# Patient Record
Sex: MALE | Race: WHITE | HISPANIC OR LATINO | Employment: UNEMPLOYED | URBAN - METROPOLITAN AREA
[De-identification: names, ages, dates, MRNs, and addresses within clinical notes are randomized per-mention and may not be internally consistent; named-entity substitution may affect disease eponyms.]

---

## 2018-08-14 ENCOUNTER — HOSPITAL ENCOUNTER (EMERGENCY)
Facility: HOSPITAL | Age: 63
Discharge: HOME | End: 2018-08-14
Attending: EMERGENCY MEDICINE

## 2018-08-14 ENCOUNTER — APPOINTMENT (EMERGENCY)
Dept: CT IMAGING | Facility: HOSPITAL | Age: 63
End: 2018-08-14

## 2018-08-14 VITALS
DIASTOLIC BLOOD PRESSURE: 93 MMHG | HEART RATE: 100 BPM | SYSTOLIC BLOOD PRESSURE: 167 MMHG | TEMPERATURE: 98.3 F | OXYGEN SATURATION: 98 % | RESPIRATION RATE: 16 BRPM | WEIGHT: 257.94 LBS

## 2018-08-14 DIAGNOSIS — S16.1XXA STRAIN OF NECK MUSCLE, INITIAL ENCOUNTER: Primary | ICD-10-CM

## 2018-08-14 DIAGNOSIS — S46.219A TEAR OF BICEPS MUSCLE, UNSPECIFIED LATERALITY, INITIAL ENCOUNTER: ICD-10-CM
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
## 2018-08-14 PROCEDURE — 96372 THER/PROPH/DIAG INJ SC/IM: CPT

## 2018-08-14 PROCEDURE — 72125 CT NECK SPINE W/O DYE: CPT

## 2018-08-14 PROCEDURE — 99284 EMERGENCY DEPT VISIT MOD MDM: CPT

## 2018-08-14 RX ORDER — CYCLOBENZAPRINE HCL 5 MG
TABLET ORAL
Qty: 12 TABLET | Refills: 0 | Status: SHIPPED | OUTPATIENT
Start: 2018-08-14

## 2018-08-14 RX ORDER — KETOROLAC TROMETHAMINE 30 MG/ML
30 INJECTION, SOLUTION INTRAMUSCULAR; INTRAVENOUS ONCE
Status: COMPLETED | OUTPATIENT
Start: 2018-08-14 | End: 2018-08-14

## 2018-08-14 RX ORDER — IBUPROFEN 800 MG/1
800 TABLET ORAL EVERY 6 HOURS PRN
Qty: 30 TABLET | Refills: 0 | Status: SHIPPED | OUTPATIENT
Start: 2018-08-14 | End: 2019-09-25

## 2018-08-14 RX ORDER — CYCLOBENZAPRINE HCL 10 MG
10 TABLET ORAL ONCE
Status: COMPLETED | OUTPATIENT
Start: 2018-08-14 | End: 2018-08-14

## 2018-08-14 RX ADMIN — CYCLOBENZAPRINE HYDROCHLORIDE 10 MG: 10 TABLET, FILM COATED ORAL at 19:24

## 2018-08-14 RX ADMIN — KETOROLAC TROMETHAMINE 30 MG: 30 INJECTION, SOLUTION INTRAMUSCULAR at 19:22
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
## 2018-08-14 NOTE — ED NOTES
Patient transported to [facility] [address] [facility], [facility]  08/14/18 6349

## 2018-08-15 NOTE — DISCHARGE INSTRUCTIONS
Cervical Strain   WHAT YOU NEED TO KNOW:   A cervical strain is a stretched or torn muscle or tendon in your neck  Tendons are strong tissues that connect muscles to bones  Common causes of cervical strains include a car accident, a fall, or a sports injury  DISCHARGE INSTRUCTIONS:   Return to the emergency department if:   · You have pain or numbness from your shoulder down to your hand  · You have problems with your vision, hearing, or balance  · You feel confused or cannot concentrate  · You have problems with movement and strength  Contact your healthcare provider if:   · You have increased swelling or pain in your neck  · You have questions or concerns about your condition or care  Medicines: You may need any of the following:  · Acetaminophen  decreases pain and fever  It is available without a doctor's order  Ask how much to take and how often to take it  Follow directions  Read the labels of all other medicines you are using to see if they also contain acetaminophen, or ask your doctor or pharmacist  Acetaminophen can cause liver damage if not taken correctly  Do not use more than 4 grams (4,000 milligrams) total of acetaminophen in one day  · NSAIDs , such as ibuprofen, help decrease swelling, pain, and fever  This medicine is available with or without a doctor's order  NSAIDs can cause stomach bleeding or kidney problems in certain people  If you take blood thinner medicine, always ask your healthcare provider if NSAIDs are safe for you  Always read the medicine label and follow directions  · Muscle relaxers  help decrease pain and muscle spasms  · Prescription pain medicine  may be given  Ask your healthcare provider how to take this medicine safely  Some prescription pain medicines contain acetaminophen  Do not take other medicines that contain acetaminophen without talking to your healthcare provider  Too much acetaminophen may cause liver damage   Prescription pain medicine may cause constipation  Ask your healthcare provider how to prevent or treat constipation  · Take your medicine as directed  Contact your healthcare provider if you think your medicine is not helping or if you have side effects  Tell him or her if you are allergic to any medicine  Keep a list of the medicines, vitamins, and herbs you take  Include the amounts, and when and why you take them  Bring the list or the pill bottles to follow-up visits  Carry your medicine list with you in case of an emergency  Manage your symptoms:   · Apply heat  on your neck for 15 to 20 minutes, 4 to 6 times a day or as directed  Heat helps decrease pain, stiffness, and muscle spasms  · Begin gentle neck exercises  as soon as you can move your neck without pain  Exercises will help decrease stiffness and improve the strength and movement of your neck  Ask your healthcare provider what kind of exercises you should do  · Gradually return to your usual activities as directed  Stop if you have pain  Avoid activities that can cause more damage to your neck, such as heavy lifting or strenuous exercise  · Sleep without a pillow  to help decrease pain  Instead, roll a small towel tightly and place it under your neck  · Go to physical therapy as directed  A physical therapist teaches you exercises to help improve movement and strength, and to decrease pain  Prevent neck injury:   · Drive safely  Make sure everyone in your car wears a seatbelt  A seatbelt can save your life if you are in an accident  Do not use your cell phone when you are driving  This could distract you and cause an accident  Pull over if you need to make a call or send a text message  · Wear helmets, lifejackets, and protective gear  Always wear a helmet when you ride a bike or motorcycle, go skiing, or play sports that could cause a head injury  Wear protective equipment when you play sports   Wear a lifejacket when you are on a boat or doing water sports  Follow up with your healthcare provider as directed: You may be referred to an orthopedist or physical therapies  Write down your questions so you remember to ask them during your visits  © 2017 [employer]  Information is for End User's use only and may not be sold, redistributed or otherwise used for commercial purposes  All illustrations and images included in CareNotes® are the copyrighted property of A [employer] M [employer]  or [employer]  The above information is an  only  It is not intended as medical advice for individual conditions or treatments  Talk to your doctor, nurse or pharmacist before following any medical regimen to see if it is safe and effective for you  Motor Vehicle Accident   WHAT YOU NEED TO KNOW:   A motor vehicle accident (MVA) can cause injury from the impact or from being thrown around inside the car  You may have a bruise on your abdomen, chest, or neck from the seatbelt  You may also have pain in your face, neck, or back  You may have pain in your knee, hip, or thigh if your body hits the dash or the steering wheel  Muscle pain is commonly worse 1 to 2 days after an MVA  DISCHARGE INSTRUCTIONS:   Call 911 if:   · You have new or worsening chest pain or shortness of breath  Return to the emergency department if:   · You have new or worsening pain in your abdomen  · You have nausea and vomiting that does not get better  · You have a severe headache  · You have weakness, tingling, or numbness in your arms or legs  · You have new or worsening pain that makes it hard for you to move  Contact your healthcare provider if:   · You have pain that develops 2 to 3 days after the MVA  · You have questions or concerns about your condition or care  Medicines:   · Pain medicine: You may be given medicine to take away or decrease pain  Do not wait until the pain is severe before you take your medicine      · NSAIDs , such as ibuprofen, help decrease swelling, pain, and fever  This medicine is available with or without a doctor's order  NSAIDs can cause stomach bleeding or kidney problems in certain people  If you take blood thinner medicine, always ask if NSAIDs are safe for you  Always read the medicine label and follow directions  Do not give these medicines to children under 10months of age without direction from your child's healthcare provider  · Take your medicine as directed  Contact your healthcare provider if you think your medicine is not helping or if you have side effects  Tell him of her if you are allergic to any medicine  Keep a list of the medicines, vitamins, and herbs you take  Include the amounts, and when and why you take them  Bring the list or the pill bottles to follow-up visits  Carry your medicine list with you in case of an emergency  Follow up with your healthcare provider as directed:  Write down your questions so you remember to ask them during your visits  Safety tips:   · Always wear your seatbelt  This will help reduce serious injury from an MVA  · Use child safety seats  Your child needs to ride in a child safety seat made for his age, height, and weight  Ask your healthcare provider for more information about child safety seats  · Decrease speed  Drive the speed limit to reduce your risk for an MVA  · Do not drive if you are tired  You will react more slowly when you are tired  The slowed reaction time will increase your risk for an MVA  · Do not talk or text on your cell phone while you drive  You cannot respond fast enough in an emergency if you are distracted by texts or conversations  · Do not drink and drive  Use a designated   Call a taxi or get a ride home with someone if you have been drinking  Do not let your friends drive if they have been drinking alcohol  · Do not use illegal drugs and drive    You may be more tired or take risks that you normally would not take  Do not drive after you take prescription medicines that make you sleepy  Self-care:   · Use ice and heat  Ice helps decrease swelling and pain  Ice may also help prevent tissue damage  Use an ice pack, or put crushed ice in a plastic bag  Cover it with a towel and apply to your injured area for 15 to 20 minutes every hour, or as directed  After 2 days, use a heating pad on your injured area  Use heat as directed  · Gently stretch  Use gentle exercises to stretch your muscles after an MVA  Ask your healthcare provider for exercises you can do  © 2017 [address] Information is for End User's use only and may not be sold, redistributed or otherwise used for commercial purposes  All illustrations and images included in CareNotes® are the copyrighted property of A [employer] M [employer] Inc  or [employer]  The above information is an  only  It is not intended as medical advice for individual conditions or treatments  Talk to your doctor, nurse or pharmacist before following any medical regimen to see if it is safe and effective for you  Muscle Strain   WHAT YOU NEED TO KNOW:   A muscle strain is a twist, pull, or tear of a muscle or tendon  A tendon is a strong elastic tissue that connects a muscle to a bone  Signs of a strained muscle include bruising and swelling over the area, pain with movement, and loss of strength  DISCHARGE INSTRUCTIONS:   Return to the emergency department if:   · You suddenly cannot feel or move your injured muscle  Contact your healthcare provider if:   · Your pain and swelling worsen or do not go away  · You have questions or concerns about your condition or care  Medicines:   · NSAIDs  help decrease swelling and pain or fever  This medicine is available with or without a doctor's order  NSAIDs can cause stomach bleeding or kidney problems in certain people   If you take blood thinner medicine, always ask your healthcare provider if NSAIDs are safe for you  Always read the medicine label and follow directions  · Muscle relaxers  help decrease pain and muscle spasms  · Take your medicine as directed  Contact your healthcare provider if you think your medicine is not helping or if you have side effects  Tell him of her if you are allergic to any medicine  Keep a list of the medicines, vitamins, and herbs you take  Include the amounts, and when and why you take them  Bring the list or the pill bottles to follow-up visits  Carry your medicine list with you in case of an emergency  Follow up with your healthcare provider as directed: Your healthcare provider may suggest that you have a follow-up visit before you go back to your usual activity  Write down your questions so you remember to ask them during your visits  Self-care:   · 3 to 7 days after the injury:  Use Rest, Ice, Compression, and Elevation (RICE) to help stop bruising and decrease pain and swelling  ¨ Rest:  Rest your muscle to allow your injury to heal  When the pain decreases, begin normal, slow movements  For mild and moderate muscle strains, you should rest your muscles for about 2 days  However, if you have a severe muscle strain, you should rest for 10 to 14 days  You may need to use crutches to walk if your muscle strain is in your legs or lower body  ¨ Ice:  Put an ice pack on the injured area  Put a towel between the ice pack and your skin  Do not put the ice pack directly on your skin  You can use a package of frozen peas instead of an ice pack  ¨ Compression:  You may need to wrap an elastic bandage around the area to decrease swelling  It should be tight enough for you to feel support  Do not wrap it too tightly  ¨ Elevation:  Keep the injured muscle raised above your heart if possible  For example if you have a strain of your lower leg muscle, lie down and prop your leg up on pillows  This helps decrease pain and swelling      · 3 to 21 days after the injury:  Start to slowly and regularly exercise your muscle  This will help it heal  If you feel pain, decrease how hard you are exercising  · 1 to 6 weeks after the injury:  Stretch the injured muscle  Hold the stretch for about 30 seconds  Do this 4 times a day  You may stretch the muscle until you feel a slight pull  Stop stretching if you feel pain  · 2 weeks to 6 months after the injury:  The goal of this phase is to return to the activity you were doing before the injury happened, without hurting the muscle again  · 3 weeks to 6 months after the injury:  Keep stretching and strengthening your muscles to avoid injury  Slowly increase the time and distance that you exercise  You may have signs and symptoms of muscle strain 6 months after the injury, even if you do things to help it heal  In this case, you may need surgery on the muscle  © 2017 [employer] Information is for End User's use only and may not be sold, redistributed or otherwise used for commercial purposes  All illustrations and images included in CareNotes® are the copyrighted property of [employer]  or [employer]  The above information is an  only  It is not intended as medical advice for individual conditions or treatments  Talk to your doctor, nurse or pharmacist before following any medical regimen to see if it is safe and effective for you

## 2018-08-15 NOTE — ED NOTES
Patient awake and alert  No distress noted  No further questions upon discharge       [place] LETITIA Grimm  08/14/18 2025

## 2019-09-25 ENCOUNTER — HOSPITAL ENCOUNTER (EMERGENCY)
Facility: HOSPITAL | Age: 64
Discharge: HOME | End: 2019-09-25
Attending: EMERGENCY MEDICINE | Admitting: EMERGENCY MEDICINE
Payer: COMMERCIAL

## 2019-09-25 ENCOUNTER — APPOINTMENT (EMERGENCY)
Dept: RADIOLOGY | Facility: HOSPITAL | Age: 64
End: 2019-09-25
Payer: COMMERCIAL

## 2019-09-25 VITALS
RESPIRATION RATE: 16 BRPM | DIASTOLIC BLOOD PRESSURE: 75 MMHG | TEMPERATURE: 98.5 F | HEIGHT: 65 IN | SYSTOLIC BLOOD PRESSURE: 139 MMHG | OXYGEN SATURATION: 97 % | WEIGHT: 255 LBS | BODY MASS INDEX: 42.49 KG/M2 | HEART RATE: 76 BPM

## 2019-09-25 DIAGNOSIS — R10.84 GENERALIZED ABDOMINAL PAIN: Primary | ICD-10-CM

## 2019-09-25 LAB
ALBUMIN SERPL BCP-MCNC: 3.5 G/DL (ref 3.5–5)
ALP SERPL-CCNC: 158 U/L (ref 46–116)
ALT SERPL W P-5'-P-CCNC: 37 U/L (ref 12–78)
ANION GAP SERPL CALCULATED.3IONS-SCNC: 8 MMOL/L (ref 4–13)
AST SERPL W P-5'-P-CCNC: 16 U/L (ref 5–45)
ATRIAL RATE: 74 BPM
BASOPHILS # BLD AUTO: 0.04 THOUSANDS/ΜL (ref 0–0.1)
BASOPHILS NFR BLD AUTO: 1 % (ref 0–1)
BILIRUB SERPL-MCNC: 0.33 MG/DL (ref 0.2–1)
BUN SERPL-MCNC: 15 MG/DL (ref 5–25)
CALCIUM SERPL-MCNC: 9.3 MG/DL (ref 8.3–10.1)
CHLORIDE SERPL-SCNC: 107 MMOL/L (ref 100–108)
CO2 SERPL-SCNC: 25 MMOL/L (ref 21–32)
CREAT SERPL-MCNC: 0.91 MG/DL (ref 0.6–1.3)
EOSINOPHIL # BLD AUTO: 0.2 THOUSAND/ΜL (ref 0–0.61)
EOSINOPHIL NFR BLD AUTO: 2 % (ref 0–6)
ERYTHROCYTE [DISTWIDTH] IN BLOOD BY AUTOMATED COUNT: 13.5 % (ref 11.6–15.1)
GFR SERPL CREATININE-BSD FRML MDRD: 91 ML/MIN/1.73SQ M
GLUCOSE SERPL-MCNC: 108 MG/DL (ref 65–140)
HCT VFR BLD AUTO: 42.3 % (ref 36.5–49.3)
HGB BLD-MCNC: 13.5 G/DL (ref 12–17)
IMM GRANULOCYTES # BLD AUTO: 0.03 THOUSAND/UL (ref 0–0.2)
IMM GRANULOCYTES NFR BLD AUTO: 0 % (ref 0–2)
LIPASE SERPL-CCNC: 92 U/L (ref 73–393)
LYMPHOCYTES # BLD AUTO: 3.18 THOUSANDS/ΜL (ref 0.6–4.47)
LYMPHOCYTES NFR BLD AUTO: 36 % (ref 14–44)
MCH RBC QN AUTO: 27.5 PG (ref 26.8–34.3)
MCHC RBC AUTO-ENTMCNC: 31.9 G/DL (ref 31.4–37.4)
MCV RBC AUTO: 86 FL (ref 82–98)
MONOCYTES # BLD AUTO: 0.71 THOUSAND/ΜL (ref 0.17–1.22)
MONOCYTES NFR BLD AUTO: 8 % (ref 4–12)
NEUTROPHILS # BLD AUTO: 4.69 THOUSANDS/ΜL (ref 1.85–7.62)
NEUTS SEG NFR BLD AUTO: 53 % (ref 43–75)
NRBC BLD AUTO-RTO: 0 /100 WBCS
P AXIS: 68 DEGREES
PLATELET # BLD AUTO: 252 THOUSANDS/UL (ref 149–390)
PMV BLD AUTO: 11.2 FL (ref 8.9–12.7)
POTASSIUM SERPL-SCNC: 4.1 MMOL/L (ref 3.5–5.3)
PR INTERVAL: 166 MS
PROT SERPL-MCNC: 7 G/DL (ref 6.4–8.2)
QRS AXIS: -24 DEGREES
QRSD INTERVAL: 100 MS
QT INTERVAL: 374 MS
QTC INTERVAL: 415 MS
RBC # BLD AUTO: 4.91 MILLION/UL (ref 3.88–5.62)
SODIUM SERPL-SCNC: 140 MMOL/L (ref 136–145)
T WAVE AXIS: 16 DEGREES
TROPONIN I SERPL-MCNC: <0.02 NG/ML
VENTRICULAR RATE: 74 BPM
WBC # BLD AUTO: 8.85 THOUSAND/UL (ref 4.31–10.16)

## 2019-09-25 PROCEDURE — 36415 COLL VENOUS BLD VENIPUNCTURE: CPT | Performed by: EMERGENCY MEDICINE

## 2019-09-25 PROCEDURE — 96374 THER/PROPH/DIAG INJ IV PUSH: CPT

## 2019-09-25 PROCEDURE — 99284 EMERGENCY DEPT VISIT MOD MDM: CPT | Performed by: EMERGENCY MEDICINE

## 2019-09-25 PROCEDURE — 74177 CT ABD & PELVIS W/CONTRAST: CPT

## 2019-09-25 PROCEDURE — 93010 ELECTROCARDIOGRAM REPORT: CPT | Performed by: INTERNAL MEDICINE

## 2019-09-25 PROCEDURE — 96361 HYDRATE IV INFUSION ADD-ON: CPT

## 2019-09-25 PROCEDURE — 85025 COMPLETE CBC W/AUTO DIFF WBC: CPT | Performed by: EMERGENCY MEDICINE

## 2019-09-25 PROCEDURE — 83690 ASSAY OF LIPASE: CPT | Performed by: EMERGENCY MEDICINE

## 2019-09-25 PROCEDURE — 93005 ELECTROCARDIOGRAM TRACING: CPT

## 2019-09-25 PROCEDURE — 84484 ASSAY OF TROPONIN QUANT: CPT | Performed by: EMERGENCY MEDICINE

## 2019-09-25 PROCEDURE — 80053 COMPREHEN METABOLIC PANEL: CPT | Performed by: EMERGENCY MEDICINE

## 2019-09-25 PROCEDURE — 99285 EMERGENCY DEPT VISIT HI MDM: CPT

## 2019-09-25 RX ORDER — ONDANSETRON 2 MG/ML
4 INJECTION INTRAMUSCULAR; INTRAVENOUS ONCE
Status: COMPLETED | OUTPATIENT
Start: 2019-09-25 | End: 2019-09-25

## 2019-09-25 RX ORDER — DICYCLOMINE HCL 20 MG
20 TABLET ORAL 2 TIMES DAILY
Qty: 20 TABLET | Refills: 0 | Status: SHIPPED | OUTPATIENT
Start: 2019-09-25 | End: 2019-10-05

## 2019-09-25 RX ADMIN — ONDANSETRON 4 MG: 2 INJECTION INTRAMUSCULAR; INTRAVENOUS at 01:21

## 2019-09-25 RX ADMIN — SODIUM CHLORIDE 500 ML: 0.9 INJECTION, SOLUTION INTRAVENOUS at 01:22

## 2019-09-25 RX ADMIN — IOHEXOL 100 ML: 350 INJECTION, SOLUTION INTRAVENOUS at 02:32
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
## 2021-04-06 ENCOUNTER — TRANSITIONAL CARE MANAGEMENT (OUTPATIENT)
Dept: FAMILY MEDICINE CLINIC | Facility: CLINIC | Age: 66
End: 2021-04-06

## 2021-05-20 ENCOUNTER — HOSPITAL ENCOUNTER (EMERGENCY)
Facility: HOSPITAL | Age: 66
Discharge: HOME | End: 2021-05-20
Attending: EMERGENCY MEDICINE
Payer: COMMERCIAL

## 2021-05-20 VITALS
HEART RATE: 73 BPM | OXYGEN SATURATION: 96 % | TEMPERATURE: 98.2 F | DIASTOLIC BLOOD PRESSURE: 91 MMHG | RESPIRATION RATE: 18 BRPM | SYSTOLIC BLOOD PRESSURE: 133 MMHG

## 2021-05-20 DIAGNOSIS — K06.8 GINGIVAL BLEEDING: Primary | ICD-10-CM

## 2021-05-20 LAB
ANION GAP SERPL CALCULATED.3IONS-SCNC: 8 MMOL/L (ref 4–13)
APTT PPP: 34 SECONDS (ref 23–37)
BASOPHILS # BLD AUTO: 0.02 THOUSANDS/ΜL (ref 0–0.1)
BASOPHILS NFR BLD AUTO: 0 % (ref 0–1)
BUN SERPL-MCNC: 25 MG/DL (ref 5–25)
CALCIUM SERPL-MCNC: 8.9 MG/DL (ref 8.3–10.1)
CHLORIDE SERPL-SCNC: 106 MMOL/L (ref 100–108)
CO2 SERPL-SCNC: 30 MMOL/L (ref 21–32)
CREAT SERPL-MCNC: 1.15 MG/DL (ref 0.6–1.3)
EOSINOPHIL # BLD AUTO: 0.25 THOUSAND/ΜL (ref 0–0.61)
EOSINOPHIL NFR BLD AUTO: 3 % (ref 0–6)
ERYTHROCYTE [DISTWIDTH] IN BLOOD BY AUTOMATED COUNT: 13.2 % (ref 11.6–15.1)
GFR SERPL CREATININE-BSD FRML MDRD: 67 ML/MIN/1.73SQ M
GLUCOSE SERPL-MCNC: 104 MG/DL (ref 65–140)
HCT VFR BLD AUTO: 45 % (ref 36.5–49.3)
HGB BLD-MCNC: 14.6 G/DL (ref 12–17)
IMM GRANULOCYTES # BLD AUTO: 0.03 THOUSAND/UL (ref 0–0.2)
IMM GRANULOCYTES NFR BLD AUTO: 0 % (ref 0–2)
INR PPP: 1.67 (ref 0.84–1.19)
LYMPHOCYTES # BLD AUTO: 3.21 THOUSANDS/ΜL (ref 0.6–4.47)
LYMPHOCYTES NFR BLD AUTO: 41 % (ref 14–44)
MCH RBC QN AUTO: 28.3 PG (ref 26.8–34.3)
MCHC RBC AUTO-ENTMCNC: 32.4 G/DL (ref 31.4–37.4)
MCV RBC AUTO: 87 FL (ref 82–98)
MONOCYTES # BLD AUTO: 0.65 THOUSAND/ΜL (ref 0.17–1.22)
MONOCYTES NFR BLD AUTO: 8 % (ref 4–12)
NEUTROPHILS # BLD AUTO: 3.77 THOUSANDS/ΜL (ref 1.85–7.62)
NEUTS SEG NFR BLD AUTO: 48 % (ref 43–75)
NRBC BLD AUTO-RTO: 0 /100 WBCS
PLATELET # BLD AUTO: 203 THOUSANDS/UL (ref 149–390)
PMV BLD AUTO: 10.9 FL (ref 8.9–12.7)
POTASSIUM SERPL-SCNC: 4.4 MMOL/L (ref 3.5–5.3)
PROTHROMBIN TIME: 19.8 SECONDS (ref 11.6–14.5)
RBC # BLD AUTO: 5.15 MILLION/UL (ref 3.88–5.62)
SODIUM SERPL-SCNC: 144 MMOL/L (ref 136–145)
WBC # BLD AUTO: 7.93 THOUSAND/UL (ref 4.31–10.16)

## 2021-05-20 PROCEDURE — 36415 COLL VENOUS BLD VENIPUNCTURE: CPT | Performed by: PHYSICIAN ASSISTANT

## 2021-05-20 PROCEDURE — 85610 PROTHROMBIN TIME: CPT | Performed by: PHYSICIAN ASSISTANT

## 2021-05-20 PROCEDURE — 99284 EMERGENCY DEPT VISIT MOD MDM: CPT | Performed by: PHYSICIAN ASSISTANT

## 2021-05-20 PROCEDURE — 80048 BASIC METABOLIC PNL TOTAL CA: CPT | Performed by: PHYSICIAN ASSISTANT

## 2021-05-20 PROCEDURE — 85730 THROMBOPLASTIN TIME PARTIAL: CPT | Performed by: PHYSICIAN ASSISTANT

## 2021-05-20 PROCEDURE — 85025 COMPLETE CBC W/AUTO DIFF WBC: CPT | Performed by: PHYSICIAN ASSISTANT

## 2021-05-20 PROCEDURE — 99283 EMERGENCY DEPT VISIT LOW MDM: CPT

## 2021-05-20 RX ORDER — TRANEXAMIC ACID 100 MG/ML
500 INJECTION, SOLUTION INTRAVENOUS ONCE
Status: DISCONTINUED | OUTPATIENT
Start: 2021-05-20 | End: 2021-05-20 | Stop reason: HOSPADM

## 2021-05-20 NOTE — ED PROVIDER NOTES
History  Chief Complaint   Patient presents with    Medical Problem     pt reports trying to get a piece of steak out of his gums around 1700  pt states his gums wont stop bleeding  pt putting pressure on gums per triage  Patient is a 60-year-old male with history atrial fibrillation with recent cardioversion 2021, on Xarelto, diastolic CHF, and appendectomy that presents emergency department with bleeding to upper left side of gums for 8 hours  Patient denies associated symptomatology  Patient states that while he was attempting to pick teeth out of his upper left molar with a toothpick, he noticed that he had bleeding that was pervasive and came to emergency department for further evaluation and it's cessation  Patient denies palliative and provocative factors  Patient affirms not effective treatment of pressure to left side of maxillary bleeding gum  Patient denies fevers, chills, nausea, vomiting, diarrhea, constipation urinary symptoms  Patient denies recent fall or recent trauma  Patient denies sick contacts or recent travel  Patient denies chest pain, shortness of breath, and abdominal pain  History provided by:  Patient   used: No    Medical Problem  Location:  Left maxillary bleeding gingiva  Severity:  Mild  Onset quality:  Unable to specify  Duration:  8 hours  Timing:  Constant  Progression:  Unchanged  Chronicity:  New  Context:  Attempting to pick steak out of his teeth with a toothpick  Relieved by:  Nothing  Worsened by:  Nothing  Ineffective treatments:  None tried  Associated symptoms: no abdominal pain, no chest pain, no congestion, no cough, no diarrhea, no fever, no headaches, no nausea, no rash, no rhinorrhea, no shortness of breath, no sore throat and no vomiting        Prior to Admission Medications   Prescriptions Last Dose Informant Patient Reported? Taking?    cyclobenzaprine (FLEXERIL) 5 mg tablet   No No   Si-2 PO TID PRN   dicyclomine (BENTYL) 20 mg tablet   No No   Sig: Take 1 tablet (20 mg total) by mouth 2 (two) times a day for 10 days      Facility-Administered Medications: None       History reviewed  No pertinent past medical history  Past Surgical History:   Procedure Laterality Date    APPENDECTOMY      BACK SURGERY         History reviewed  No pertinent family history  I have reviewed and agree with the history as documented  E-Cigarette/Vaping     E-Cigarette/Vaping Substances     Social History     Tobacco Use    Smoking status: Never Smoker    Smokeless tobacco: Never Used   Substance Use Topics    Alcohol use: No    Drug use: No       Review of Systems   Constitutional: Negative for activity change, appetite change, chills and fever  HENT: Negative for congestion, postnasal drip, rhinorrhea, sinus pressure, sinus pain, sore throat and tinnitus  Eyes: Negative for photophobia and visual disturbance  Respiratory: Negative for cough, chest tightness and shortness of breath  Cardiovascular: Negative for chest pain and palpitations  Gastrointestinal: Negative for abdominal pain, constipation, diarrhea, nausea and vomiting  Genitourinary: Negative for difficulty urinating, dysuria, flank pain, frequency and urgency  Musculoskeletal: Negative for back pain, gait problem, neck pain and neck stiffness  Skin: Positive for wound  Negative for pallor and rash  Allergic/Immunologic: Negative for environmental allergies and food allergies  Neurological: Negative for dizziness, weakness, numbness and headaches  Psychiatric/Behavioral: Negative for confusion  All other systems reviewed and are negative  Physical Exam  Physical Exam  Vitals signs and nursing note reviewed  Constitutional:       General: He is awake  Appearance: Normal appearance  He is well-developed  He is not ill-appearing, toxic-appearing or diaphoretic        Comments: /91   Pulse 73   Temp 98 2 °F (36 8 °C) (Oral)   Resp 18   SpO2 96%      HENT:      Head: Normocephalic and atraumatic  Right Ear: Hearing and external ear normal  No decreased hearing noted  No drainage, swelling or tenderness  No mastoid tenderness  Left Ear: Hearing and external ear normal  No decreased hearing noted  No drainage, swelling or tenderness  No mastoid tenderness  Nose: Nose normal       Mouth/Throat:      Lips: Pink  Mouth: Mucous membranes are moist       Pharynx: Oropharynx is clear  Uvula midline  Eyes:      General: Lids are normal  Vision grossly intact  Right eye: No discharge  Left eye: No discharge  Extraocular Movements: Extraocular movements intact  Conjunctiva/sclera: Conjunctivae normal       Pupils: Pupils are equal, round, and reactive to light  Neck:      Musculoskeletal: Full passive range of motion without pain, normal range of motion and neck supple  Normal range of motion  No neck rigidity, spinous process tenderness or muscular tenderness  Vascular: No JVD  Trachea: Trachea and phonation normal  No tracheal tenderness or tracheal deviation  Cardiovascular:      Rate and Rhythm: Normal rate  Pulses: Normal pulses  Radial pulses are 2+ on the right side and 2+ on the left side  Pulmonary:      Effort: Pulmonary effort is normal       Breath sounds: Normal breath sounds  Abdominal:      General: Abdomen is flat  There is no distension  Palpations: Abdomen is not rigid  Musculoskeletal: Normal range of motion  Skin:     General: Skin is warm  Capillary Refill: Capillary refill takes less than 2 seconds  Neurological:      General: No focal deficit present  Mental Status: He is alert and oriented to person, place, and time  GCS: GCS eye subscore is 4  GCS verbal subscore is 5  GCS motor subscore is 6  Sensory: No sensory deficit  Deep Tendon Reflexes: Reflexes are normal and symmetric        Reflex Scores:       Patellar reflexes are 2+ on the right side and 2+ on the left side  Psychiatric:         Mood and Affect: Mood normal          Speech: Speech normal          Behavior: Behavior normal  Behavior is cooperative           Vital Signs  ED Triage Vitals [05/20/21 0308]   Temperature Pulse Respirations Blood Pressure SpO2   98 2 °F (36 8 °C) 73 18 133/91 96 %      Temp Source Heart Rate Source Patient Position - Orthostatic VS BP Location FiO2 (%)   Oral Monitor -- -- --      Pain Score       --           Vitals:    05/20/21 0308   BP: 133/91   Pulse: 73         Visual Acuity      ED Medications  Medications   tranexamic acid 100mg/mL (for epistaxis) 500 mg (has no administration in time range)       Diagnostic Studies  Results Reviewed     Procedure Component Value Units Date/Time    APTT [[id number]]  (Normal) Collected: 05/20/21 0343    Lab Status: Final result Specimen: Blood from Arm, Left Updated: 05/20/21 0407     PTT 34 seconds     Protime-INR [[id number]]  (Abnormal) Collected: 05/20/21 0343    Lab Status: Final result Specimen: Blood from Arm, Left Updated: 05/20/21 0407     Protime 19 8 seconds      INR 7 42    Basic metabolic panel [[id number]] Collected: 05/20/21 0343    Lab Status: Final result Specimen: Blood from Arm, Left Updated: 05/20/21 0406     Sodium 144 mmol/L      Potassium 4 4 mmol/L      Chloride 106 mmol/L      CO2 30 mmol/L      ANION GAP 8 mmol/L      BUN 25 mg/dL      Creatinine 1 15 mg/dL      Glucose 104 mg/dL      Calcium 8 9 mg/dL      eGFR 67 ml/min/1 73sq m     Narrative:      [employer] guidelines for Chronic Kidney Disease (CKD):     Stage 1 with normal or high GFR (GFR > 90 mL/min/1 73 square meters)    Stage 2 Mild CKD (GFR = 60-89 mL/min/1 73 square meters)    Stage 3A Moderate CKD (GFR = 45-59 mL/min/1 73 square meters)    Stage 3B Moderate CKD (GFR = 30-44 mL/min/1 73 square meters)    Stage 4 Severe CKD (GFR = 15-29 mL/min/1 73 square meters)    Stage 5 End Stage CKD (GFR <15 mL/min/1 73 square meters)  Note: GFR calculation is accurate only with a steady state creatinine    CBC and differential [[id number]] Collected: 05/20/21 0343    Lab Status: Final result Specimen: Blood from Arm, Left Updated: 05/20/21 0351     WBC 7 93 Thousand/uL      RBC 5 15 Million/uL      Hemoglobin 14 6 g/dL      Hematocrit 45 0 %      MCV 87 fL      MCH 28 3 pg      MCHC 32 4 g/dL      RDW 13 2 %      MPV 10 9 fL      Platelets 265 Thousands/uL      nRBC 0 /100 WBCs      Neutrophils Relative 48 %      Immat GRANS % 0 %      Lymphocytes Relative 41 %      Monocytes Relative 8 %      Eosinophils Relative 3 %      Basophils Relative 0 %      Neutrophils Absolute 3 77 Thousands/µL      Immature Grans Absolute 0 03 Thousand/uL      Lymphocytes Absolute 3 21 Thousands/µL      Monocytes Absolute 0 65 Thousand/µL      Eosinophils Absolute 0 25 Thousand/µL      Basophils Absolute 0 02 Thousands/µL                  No orders to display              Procedures  Procedures         ED Course  ED Course as of May 20 0504   Thu May 20, 2021   0326 Tdap 11/16/2020      4823 Txa to left maxillary first molar; gingival bleeding       0409 Patient verbalizes that he takes Xarelto        0432 Complete hemostatic control of left maxillary gingival bleeding; proximal to 1st molar, left                                              MDM  Number of Diagnoses or Management Options  Gingival bleeding: new and does not require workup     Amount and/or Complexity of Data Reviewed  Clinical lab tests: ordered and reviewed  Review and summarize past medical records: yes    Risk of Complications, Morbidity, and/or Mortality  Presenting problems: low  Diagnostic procedures: low  Management options: low    Patient Progress  Patient progress: stable    Patient is a 62-year-old male with history atrial fibrillation with recent cardioversion April 2021, on Xarelto, diastolic CHF, and appendectomy that presents emergency department with bleeding to upper left side of gums for 8 hours  Patient hemodynamically stable and afebrile  Patient states that he is currently on Xarelto for atrial fibrillation-PT INR 19 8/1 67  APTT normal  Normal electrolytes, normal H&H, normal platelets  Applied TXA to maxillary left 1st molar proximal to gingival bleeding; abrasion-no fluctuance, induration, no laceration; no tenderness  Complete hemostatic control attained of aforementioned area utilizing TXA topically  Follow-up with PCP  Follow-up with emergency department symptoms persist or exacerbate  Patient demonstrates verbal understanding of all clinical laboratory findings, discharge instructions, follow-up verbalized agreement with current treatment plan  Disposition  Final diagnoses:   Gingival bleeding - Left maxillary     Time reflects when diagnosis was documented in both MDM as applicable and the Disposition within this note     Time User Action Codes Description Comment    5/20/2021  4:36 AM Lorena Amaya Add [K06 8] Gingival bleeding     5/20/2021  4:36 AM Lorena Amaya Modify [K06 8] Gingival bleeding Left maxillary      ED Disposition     ED Disposition Condition Date/Time Comment    Discharge Stable Thu May 20, 2021  4:36 AM Royer Marte discharge to home/self care              Follow-up Information     Follow up With Specialties Details Why Contact Info Additional 340 Kayden Lee MD Internal Medicine   [address] [facility]  [facility] [address]       [address] Emergency Department Emergency Medicine   [address] Emergency Department,  [address], [place], [address]          Discharge Medication List as of 5/20/2021  4:39 AM      CONTINUE these medications which have NOT CHANGED    Details   cyclobenzaprine (FLEXERIL) 5 mg tablet 1-2 PO TID PRN, Normal dicyclomine (BENTYL) 20 mg tablet Take 1 tablet (20 mg total) by mouth 2 (two) times a day for 10 days, Starting Wed 9/25/2019, Until Sat 10/5/2019, Print           No discharge procedures on file      PDMP Review       Value Time User    PDMP Reviewed  Yes 5/20/2021  3:19 AM Tom Ruby MD          ED Provider  Electronically Signed by           Earlis Sandhoff, PA-C  05/20/21 5731

## 2024-11-21 ENCOUNTER — APPOINTMENT (EMERGENCY)
Dept: RADIOLOGY | Facility: HOSPITAL | Age: 69
End: 2024-11-21
Payer: COMMERCIAL

## 2024-11-21 ENCOUNTER — HOSPITAL ENCOUNTER (EMERGENCY)
Facility: HOSPITAL | Age: 69
Discharge: HOME | End: 2024-11-21
Payer: COMMERCIAL

## 2024-11-21 VITALS
HEART RATE: 70 BPM | OXYGEN SATURATION: 96 % | TEMPERATURE: 98.4 F | SYSTOLIC BLOOD PRESSURE: 143 MMHG | DIASTOLIC BLOOD PRESSURE: 78 MMHG | RESPIRATION RATE: 18 BRPM

## 2024-11-21 DIAGNOSIS — M54.32 LEFT SIDED SCIATICA: ICD-10-CM

## 2024-11-21 DIAGNOSIS — R07.9 CHEST PAIN: Primary | ICD-10-CM

## 2024-11-21 DIAGNOSIS — M25.519 SHOULDER PAIN: ICD-10-CM

## 2024-11-21 DIAGNOSIS — R20.2 PARESTHESIA OF BOTH HANDS: ICD-10-CM

## 2024-11-21 LAB
2HR DELTA HS TROPONIN: -1 NG/L
ALBUMIN SERPL BCG-MCNC: 4.2 G/DL (ref 3.5–5)
ALP SERPL-CCNC: 166 U/L (ref 34–104)
ALT SERPL W P-5'-P-CCNC: 23 U/L (ref 7–52)
ANION GAP SERPL CALCULATED.3IONS-SCNC: 6 MMOL/L (ref 4–13)
AST SERPL W P-5'-P-CCNC: 17 U/L (ref 13–39)
BASOPHILS # BLD AUTO: 0.02 THOUSANDS/ÂΜL (ref 0–0.1)
BASOPHILS NFR BLD AUTO: 0 % (ref 0–1)
BILIRUB SERPL-MCNC: 0.52 MG/DL (ref 0.2–1)
BUN SERPL-MCNC: 22 MG/DL (ref 5–25)
CALCIUM SERPL-MCNC: 9.2 MG/DL (ref 8.4–10.2)
CARDIAC TROPONIN I PNL SERPL HS: 7 NG/L (ref ?–50)
CARDIAC TROPONIN I PNL SERPL HS: 8 NG/L (ref ?–50)
CHLORIDE SERPL-SCNC: 105 MMOL/L (ref 96–108)
CO2 SERPL-SCNC: 28 MMOL/L (ref 21–32)
CREAT SERPL-MCNC: 0.88 MG/DL (ref 0.6–1.3)
EOSINOPHIL # BLD AUTO: 0.13 THOUSAND/ÂΜL (ref 0–0.61)
EOSINOPHIL NFR BLD AUTO: 2 % (ref 0–6)
ERYTHROCYTE [DISTWIDTH] IN BLOOD BY AUTOMATED COUNT: 13.6 % (ref 11.6–15.1)
GFR SERPL CREATININE-BSD FRML MDRD: 88 ML/MIN/1.73SQ M
GLUCOSE SERPL-MCNC: 106 MG/DL (ref 65–140)
HCT VFR BLD AUTO: 44.5 % (ref 36.5–49.3)
HGB BLD-MCNC: 14.4 G/DL (ref 12–17)
IMM GRANULOCYTES # BLD AUTO: 0.03 THOUSAND/UL (ref 0–0.2)
IMM GRANULOCYTES NFR BLD AUTO: 0 % (ref 0–2)
LYMPHOCYTES # BLD AUTO: 3.13 THOUSANDS/ÂΜL (ref 0.6–4.47)
LYMPHOCYTES NFR BLD AUTO: 36 % (ref 14–44)
MCH RBC QN AUTO: 27.9 PG (ref 26.8–34.3)
MCHC RBC AUTO-ENTMCNC: 32.4 G/DL (ref 31.4–37.4)
MCV RBC AUTO: 86 FL (ref 82–98)
MONOCYTES # BLD AUTO: 0.57 THOUSAND/ÂΜL (ref 0.17–1.22)
MONOCYTES NFR BLD AUTO: 7 % (ref 4–12)
NEUTROPHILS # BLD AUTO: 4.73 THOUSANDS/ÂΜL (ref 1.85–7.62)
NEUTS SEG NFR BLD AUTO: 55 % (ref 43–75)
NRBC BLD AUTO-RTO: 0 /100 WBCS
PLATELET # BLD AUTO: 244 THOUSANDS/UL (ref 149–390)
PMV BLD AUTO: 10.5 FL (ref 8.9–12.7)
POTASSIUM SERPL-SCNC: 4 MMOL/L (ref 3.5–5.3)
PROT SERPL-MCNC: 6.9 G/DL (ref 6.4–8.4)
RBC # BLD AUTO: 5.17 MILLION/UL (ref 3.88–5.62)
SODIUM SERPL-SCNC: 139 MMOL/L (ref 135–147)
WBC # BLD AUTO: 8.61 THOUSAND/UL (ref 4.31–10.16)

## 2024-11-21 PROCEDURE — 93005 ELECTROCARDIOGRAM TRACING: CPT

## 2024-11-21 PROCEDURE — 84484 ASSAY OF TROPONIN QUANT: CPT

## 2024-11-21 PROCEDURE — 36415 COLL VENOUS BLD VENIPUNCTURE: CPT

## 2024-11-21 PROCEDURE — 85025 COMPLETE CBC W/AUTO DIFF WBC: CPT

## 2024-11-21 PROCEDURE — 80053 COMPREHEN METABOLIC PANEL: CPT

## 2024-11-21 PROCEDURE — 99285 EMERGENCY DEPT VISIT HI MDM: CPT | Performed by: EMERGENCY MEDICINE

## 2024-11-21 PROCEDURE — 71045 X-RAY EXAM CHEST 1 VIEW: CPT

## 2024-11-21 PROCEDURE — 99285 EMERGENCY DEPT VISIT HI MDM: CPT

## 2024-11-21 RX ORDER — ACETAMINOPHEN 325 MG/1
975 TABLET ORAL ONCE
Status: COMPLETED | OUTPATIENT
Start: 2024-11-21 | End: 2024-11-21

## 2024-11-21 RX ADMIN — ACETAMINOPHEN 975 MG: 325 TABLET, FILM COATED ORAL at 15:13

## 2024-11-21 RX ADMIN — DICLOFENAC SODIUM 4 G: 10 GEL TOPICAL at 15:14

## 2024-11-21 NOTE — DISCHARGE INSTRUCTIONS
Please follow up with your primary care provider, cardiology, and orthopedic surgery for further management of your symptoms. Referrals have been placed for you.     You may use voltaren cream as well as Tylenol for your pain. You can take 1000 mg Tylenol every 8 hours. Do not go over 3000 mg in 24 hours.    Please do NOT take ibuprofen, Advil, Aleve, or any other NSAIDs.

## 2024-11-21 NOTE — ED ATTENDING ATTESTATION
11/21/2024  I, Richard Benson MD, saw and evaluated the patient. I have discussed the patient with the resident/non-physician practitioner and agree with the resident's/non-physician practitioner's findings, Plan of Care, and MDM as documented in the resident's/non-physician practitioner's note, except where noted. All available labs and Radiology studies were reviewed.  I was present for key portions of any procedure(s) performed by the resident/non-physician practitioner and I was immediately available to provide assistance.       At this point I agree with the current assessment done in the Emergency Department.  I have conducted an independent evaluation of this patient a history and physical is as follows:  Briefly,  pain69-year-old male presenting with left-sided chest pain. Patient states that he has had chronic right-sided shoulder pain over the past 1.5 months,  over the past few days began having pain in the left upper chest.  This pain is dull, moderate intensity, worse with moving the arm, picking things up, or pressing the area, unchanged with exertion.  He denies shortness of breath, fever, nausea, vomiting, diarrhea or other symptoms.  Patient states his wife is disabled and he needs to lift her to transfer her as she cannot walk, this seems to be exacerbating the pain in his shoulders.  On examination, patient tender to palpation at the superior aspect of the right shoulder, pain reproduced with abduction of that shoulder, on the left side patient is point tender to the left upper chest, no crepitus, no deformity, no pain out of proportion.  Tenderness exactly reproduces his chest pain.  No swelling the legs, heart sounds normal, lungs clear to auscultation.  In no acute distress, no diaphoresis.  Workup in ER overall reassuring, with reassuring EKG unchanged from prior, negative serial troponins, otherwise reassuring blood work and plain films.  Overall, seems consistent with musculoskeletal  pain, likely exacerbated by frequent lifting of his wife, low suspicion for ACS although patient does have multiple cardiac risk factors. Treated symptomatically, discharged with strict return precautions, follow with primary care doctor, cardiology, as well as orthopedics.    ED Course         Critical Care Time  Procedures

## 2024-11-22 LAB
ATRIAL RATE: 72 BPM
ATRIAL RATE: 86 BPM
P AXIS: 14 DEGREES
P AXIS: 36 DEGREES
PR INTERVAL: 156 MS
PR INTERVAL: 162 MS
QRS AXIS: -4 DEGREES
QRS AXIS: 13 DEGREES
QRSD INTERVAL: 104 MS
QRSD INTERVAL: 104 MS
QT INTERVAL: 366 MS
QT INTERVAL: 372 MS
QTC INTERVAL: 400 MS
QTC INTERVAL: 445 MS
T WAVE AXIS: -28 DEGREES
T WAVE AXIS: -5 DEGREES
VENTRICULAR RATE: 72 BPM
VENTRICULAR RATE: 86 BPM

## 2024-11-22 PROCEDURE — 93010 ELECTROCARDIOGRAM REPORT: CPT | Performed by: INTERNAL MEDICINE

## 2024-11-22 NOTE — ED PROVIDER NOTES
Time reflects when diagnosis was documented in both MDM as applicable and the Disposition within this note       Time User Action Codes Description Comment    11/21/2024  3:02 PM Lucy Sanchez Add [R07.9] Chest pain     11/21/2024  3:02 PM Rayne Sancheze Add [M25.519] Shoulder pain     11/21/2024  3:02 PM DanielRayne trimblee Add [R20.2] Paresthesia of both hands     11/21/2024  3:02 PM Lucy Sanchez Add [M54.32] Left sided sciatica           ED Disposition       ED Disposition   Discharge    Condition   Stable    Date/Time   Thu Nov 21, 2024  3:10 PM    Comment   Vic Lara discharge to home/self care.                   Assessment & Plan       Medical Decision Making    See ED course for MDM.    ED Course as of 11/22/24 0006   Thu Nov 21, 2024   1500 DDx includes but not limited to: ACS, strain, sprain, contusion, CHF exacerbation.   1501 Delta 2hr hsTnI: -1  Stable, low suspicion for ACS per algorithm   1516 Doubt ACS. Suspect patient's symptoms are musculoskeletal given reproducible nature of his pain. Will recommend supportive care with Tylenol and Voltaren cream. He will need to follow up with his PCP, cardiology, and orthopedics for further management of his symptoms today, referral placed. Return precautions discussed. Pt voices understanding and agrees with plan. All questions and concerns addressed at this time.   1543 XR chest 1 view portable  Mild pulmonary venous congestion and borderline cardiomegaly.       Medications   acetaminophen (TYLENOL) tablet 975 mg (975 mg Oral Given 11/21/24 1513)       ED Risk Strat Scores   HEART Risk Score      Flowsheet Row Most Recent Value   Heart Score Risk Calculator    History 0 Filed at: 11/21/2024 1457   ECG 0 Filed at: 11/21/2024 1457   Age 2 Filed at: 11/21/2024 1457   Risk Factors 2 Filed at: 11/21/2024 1457   Troponin 0 Filed at: 11/21/2024 1457   HEART Score 4 Filed at: 11/21/2024 1457                                                   History of Present Illness        Chief Complaint   Patient presents with    Chest Pain     Patient reports left sided chest pain that travels down his arm, worse when lying down, also reports right arm pain       History reviewed. No pertinent past medical history.   Past Surgical History:   Procedure Laterality Date    APPENDECTOMY      BACK SURGERY        History reviewed. No pertinent family history.   Social History     Tobacco Use    Smoking status: Never    Smokeless tobacco: Never   Substance Use Topics    Alcohol use: No    Drug use: No      E-Cigarette/Vaping      E-Cigarette/Vaping Substances      I have reviewed and agree with the history as documented.     HPI  Patient is a 69-year-old male with a history of hypertension, hyperlipidemia, diabetes, presenting with left-sided chest pain.  Pain is worse at night, reproducible with palpation and laying on L side.  Denies nausea, vomiting, diaphoresis, worsening of his chronic shortness of breath. 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  Reports tenderness to both shoulders, as well as some chronic bilateral paresthesias to the hands, and some sciatic pain in the left hip. Denies falls or recent trauma. Reports he takes ibuprofen for pain with some relief.  Denies fevers, chills, change in medication, bowel or urinary changes, weakness of extremities, or any other symptoms at this time.    Review of Systems   Constitutional:  Negative for activity change, fever and unexpected weight change.   Respiratory:  Negative for cough, chest tightness and shortness of breath.    Cardiovascular:  Positive for chest pain. Negative for palpitations.   Gastrointestinal:  Negative for abdominal pain, diarrhea, nausea and vomiting.   Genitourinary:  Negative for dysuria and hematuria.   Musculoskeletal:  Positive for myalgias.   Skin:  Negative for wound.   Allergic/Immunologic: Negative for immunocompromised state.   Neurological:  Negative for syncope.    All other systems reviewed and are negative.          Objective       ED Triage Vitals   Temperature Pulse Blood Pressure Respirations SpO2 Patient Position - Orthostatic VS   11/21/24 1145 11/21/24 1144 11/21/24 1146 11/21/24 1144 11/21/24 1146 11/21/24 1359   98.4 °F (36.9 °C) 86 141/88 18 94 % Sitting      Temp Source Heart Rate Source BP Location FiO2 (%) Pain Score    11/21/24 1144 11/21/24 1359 11/21/24 1359 -- --    Oral Monitor Right arm        Vitals      Date and Time Temp Pulse SpO2 Resp BP Pain Score FACES Pain Rating User   11/21/24 1500 -- 70 96 % 18 143/78 -- -- AW   11/21/24 1359 -- 73 97 % 18 141/69 -- -- AW   11/21/24 1146 -- -- -- -- 141/88 -- -- NR   11/21/24 1146 -- -- 94 % Simultaneous filing. User may not have seen previous data. -- -- -- -- AB   11/21/24 1145 98.4 °F (36.9 °C) -- -- -- -- -- -- AB   11/21/24 1144 -- 86 -- 18 -- -- -- NR            Physical Exam  Vitals and nursing note reviewed.   Constitutional:       Appearance: Normal appearance. He is not ill-appearing, toxic-appearing or diaphoretic.   HENT:      Head: Normocephalic and atraumatic.      Nose: Nose normal.   Eyes:      Extraocular Movements: Extraocular movements intact.      Conjunctiva/sclera: Conjunctivae normal.   Cardiovascular:      Rate and Rhythm: Normal rate and regular rhythm.      Pulses: Normal pulses.           Radial pulses are 2+ on the right side and 2+ on the left side.        Dorsalis pedis pulses are 2+ on the right side and 2+ on the left side.      Heart sounds: No murmur heard.     No friction rub. No gallop.   Pulmonary:      Effort: Pulmonary effort is normal. No respiratory distress.      Breath sounds: Normal breath sounds. No decreased breath sounds, wheezing, rhonchi or rales.   Chest:      Chest wall: Tenderness present.      Comments: Tenderness to palpation to L anterior chest wall  Abdominal:      General: Bowel sounds are normal.      Palpations: Abdomen is soft.      Tenderness: There  is no abdominal tenderness.   Musculoskeletal:         General: No swelling or tenderness. Normal range of motion.      Cervical back: Normal range of motion. No rigidity or tenderness.      Right lower leg: No edema.      Left lower leg: No edema.      Comments: Decreased ROM of R shoulder secondary to pain. No obvious deformities. Neurovascularly intact.    FROM of L shoulder. No obvious deformities. Tenderness to palpation of bilateral trapezius muscles. Neurovascularly intact.   Skin:     General: Skin is warm and dry.      Capillary Refill: Capillary refill takes less than 2 seconds.   Neurological:      Mental Status: He is alert and oriented to person, place, and time.      Cranial Nerves: No cranial nerve deficit.      Sensory: No sensory deficit.      Motor: No weakness.         Results Reviewed       Procedure Component Value Units Date/Time    HS Troponin I 2hr [[id number]]  (Normal) Collected: 11/21/24 1355    Lab Status: Final result Specimen: Blood from Arm, Left Updated: 11/21/24 1424     hs TnI 2hr 7 ng/L      Delta 2hr hsTnI -1 ng/L     HS Troponin 0hr (reflex protocol) [[id number]]  (Normal) Collected: 11/21/24 1145    Lab Status: Final result Specimen: Blood from Arm, Left Updated: 11/21/24 1234     hs TnI 0hr 8 ng/L     Comprehensive metabolic panel [[id number]]  (Abnormal) Collected: 11/21/24 1145    Lab Status: Final result Specimen: Blood from Arm, Left Updated: 11/21/24 1225     Sodium 139 mmol/L      Potassium 4.0 mmol/L      Chloride 105 mmol/L      CO2 28 mmol/L      ANION GAP 6 mmol/L      BUN 22 mg/dL      Creatinine 0.88 mg/dL      Glucose 106 mg/dL      Calcium 9.2 mg/dL      AST 17 U/L      ALT 23 U/L      Alkaline Phosphatase 166 U/L      Total Protein 6.9 g/dL      Albumin 4.2 g/dL      Total Bilirubin 0.52 mg/dL      eGFR 88 ml/min/1.73sq m     Narrative:      National Kid[employer] Foundation guidelines for Chronic Kidney Disease (CKD):     Stage 1 with normal or high GFR (GFR > 90  mL/min/1.73 square meters)    Stage 2 Mild CKD (GFR = 60-89 mL/min/1.73 square meters)    Stage 3A Moderate CKD (GFR = 45-59 mL/min/1.73 square meters)    Stage 3B Moderate CKD (GFR = 30-44 mL/min/1.73 square meters)    Stage 4 Severe CKD (GFR = 15-29 mL/min/1.73 square meters)    Stage 5 End Stage CKD (GFR <15 mL/min/1.73 square meters)  Note: GFR calculation is accurate only with a steady state creatinine    CBC and differential [[id number]] Collected: 24 1145    Lab Status: Final result Specimen: Blood from Arm, Left Updated: 24 1158     WBC 8.61 Thousand/uL      RBC 5.17 Million/uL      Hemoglobin 14.4 g/dL      Hematocrit 44.5 %      MCV 86 fL      MCH 27.9 pg      MCHC 32.4 g/dL      RDW 13.6 %      MPV 10.5 fL      Platelets 244 Thousands/uL      nRBC 0 /100 WBCs      Segmented % 55 %      Immature Grans % 0 %      Lymphocytes % 36 %      Monocytes % 7 %      Eosinophils Relative 2 %      Basophils Relative 0 %      Absolute Neutrophils 4.73 Thousands/µL      Absolute Immature Grans 0.03 Thousand/uL      Absolute Lymphocytes 3.13 Thousands/µL      Absolute Monocytes 0.57 Thousand/µL      Eosinophils Absolute 0.13 Thousand/µL      Basophils Absolute 0.02 Thousands/µL             XR chest 1 view portable   Final Interpretation by Brad Newman MD ( [id number])      Mild pulmonary venous congestion and borderline cardiomegaly.            Resident: Linda Huff I, the attending radiologist, have reviewed the images and agree with the final report above.      Workstation performed: [id number]             Procedures    ED Medication and Procedure Management   Prior to Admission Medications   Prescriptions Last Dose Informant Patient Reported? Taking?   cyclobenzaprine (FLEXERIL) 5 mg tablet   No No   Si-2 PO TID PRN   dicyclomine (BENTYL) 20 mg tablet   No No   Sig: Take 1 tablet (20 mg total) by mouth 2 (two) times a day for 10 days      Facility-Administered Medications: None     Discharge  Medication List as of 11/21/2024  3:16 PM        CONTINUE these medications which have NOT CHANGED    Details   cyclobenzaprine (FLEXERIL) 5 mg tablet 1-2 PO TID PRN, Normal      dicyclomine (BENTYL) 20 mg tablet Take 1 tablet (20 mg total) by mouth 2 (two) times a day for 10 days, Starting Wed 9/25/2019, Until Sat 10/5/2019, Print             ED SEPSIS DOCUMENTATION   Time reflects when diagnosis was documented in both MDM as applicable and the Disposition within this note       Time User Action Codes Description Comment    11/21/2024  3:02 PM Lucy Sanchez [R07.9] Chest pain     11/21/2024  3:02 PM Lucy Sanchez [M25.519] Shoulder pain     11/21/2024  3:02 PM Lucy Sanchez [R20.2] Paresthesia of both hands     11/21/2024  3:02 PM Lucy Sanchez [M54.32] Left sided sciatica                  Lucy Sanchez MD  11/22/24 0006

## 2024-11-26 ENCOUNTER — OFFICE VISIT (OUTPATIENT)
Dept: OBGYN CLINIC | Facility: CLINIC | Age: 69
End: 2024-11-26
Payer: COMMERCIAL

## 2024-11-26 ENCOUNTER — APPOINTMENT (OUTPATIENT)
Dept: RADIOLOGY | Age: 69
End: 2024-11-26
Payer: COMMERCIAL

## 2024-11-26 VITALS
HEIGHT: 65 IN | SYSTOLIC BLOOD PRESSURE: 147 MMHG | BODY MASS INDEX: 41.32 KG/M2 | DIASTOLIC BLOOD PRESSURE: 78 MMHG | WEIGHT: 248 LBS | HEART RATE: 87 BPM

## 2024-11-26 DIAGNOSIS — M54.32 LEFT SIDED SCIATICA: ICD-10-CM

## 2024-11-26 DIAGNOSIS — M25.519 SHOULDER PAIN: ICD-10-CM

## 2024-11-26 DIAGNOSIS — R20.2 PARESTHESIA OF BOTH HANDS: ICD-10-CM

## 2024-11-26 PROCEDURE — 99204 OFFICE O/P NEW MOD 45 MIN: CPT | Performed by: ORTHOPAEDIC SURGERY

## 2024-11-26 PROCEDURE — 73030 X-RAY EXAM OF SHOULDER: CPT

## 2024-11-26 RX ORDER — SACUBITRIL AND VALSARTAN 24; 26 MG/1; MG/1
1 TABLET, FILM COATED ORAL 2 TIMES DAILY
COMMUNITY
Start: 2024-10-14

## 2024-11-26 RX ORDER — DULOXETIN HYDROCHLORIDE 30 MG/1
CAPSULE, DELAYED RELEASE ORAL
COMMUNITY
Start: 2024-10-25

## 2024-11-26 RX ORDER — BUSPIRONE HYDROCHLORIDE 5 MG/1
TABLET ORAL
COMMUNITY
Start: 2024-11-13

## 2024-11-26 RX ORDER — CHOLECALCIFEROL (VITAMIN D3) 25 MCG
TABLET ORAL
COMMUNITY
Start: 2024-10-25

## 2024-11-26 RX ORDER — RIVAROXABAN 20 MG/1
TABLET, FILM COATED ORAL
COMMUNITY
Start: 2024-10-25

## 2024-11-26 RX ORDER — PRAVASTATIN SODIUM 40 MG
TABLET ORAL
COMMUNITY
Start: 2024-10-12

## 2024-11-26 RX ORDER — SEMAGLUTIDE 2.68 MG/ML
INJECTION, SOLUTION SUBCUTANEOUS
COMMUNITY
Start: 2024-11-15

## 2024-11-26 RX ORDER — FUROSEMIDE 40 MG/1
40 TABLET ORAL DAILY
COMMUNITY
Start: 2024-06-06

## 2024-11-26 RX ORDER — PANTOPRAZOLE SODIUM 20 MG/1
TABLET, DELAYED RELEASE ORAL
COMMUNITY
Start: 2024-10-25

## 2024-11-26 NOTE — PROGRESS NOTES
CHIEF COMPLAIN/REASON FOR VISIT  Chief Complaint   Patient presents with    Right Shoulder - Pain       HISTORY OF PRESENT ILLNESS  Vic Lara is a RHD 69 y.o. male who presents for evaluation of their right shoulder. About 2 months ago, he was leaf blowing and injuried his right shoulder. His wife handicap, so he is often lifting and helping with that shoulder. He notes significant anterolateral pain and weakness, worse with overhead activities. He notes bilateral hip pain that is worse with activity.  He denies any other ortho complaints today.     REVIEW OF SYSTEMS  Review of systems was performed and, woutside that mentioned in the HPI, it was negative for symptomology related to the integumentary, hematologic, immunologic, allergic, neurologic, cardiovascular, respiratory, GI or  systems.     MEDICAL HISTORY  There is no problem list on file for this patient.      SURGICAL HISTORY  Past Surgical History:   Procedure Laterality Date    APPENDECTOMY      BACK SURGERY         CURRENT MEDICATIONS    Current Outpatient Medications:     busPIRone (BUSPAR) 5 mg tablet, , Disp: , Rfl:     cholecalciferol (VITAMIN D3) 1,000 units tablet, , Disp: , Rfl:     cyclobenzaprine (FLEXERIL) 5 mg tablet, 1-2 PO TID PRN, Disp: 12 tablet, Rfl: 0    DULoxetine (CYMBALTA) 30 mg delayed release capsule, , Disp: , Rfl:     Entresto 24-26 MG TABS, Take 1 tablet by mouth 2 (two) times a day, Disp: , Rfl:     furosemide (LASIX) 40 mg tablet, Take 40 mg by mouth daily, Disp: , Rfl:     Ozempic, 2 MG/DOSE, 8 MG/3ML injection pen, , Disp: , Rfl:     pantoprazole (PROTONIX) 20 mg tablet, , Disp: , Rfl:     pravastatin (PRAVACHOL) 40 mg tablet, , Disp: , Rfl:     Xarelto 20 MG tablet, , Disp: , Rfl:     dicyclomine (BENTYL) 20 mg tablet, Take 1 tablet (20 mg total) by mouth 2 (two) times a day for 10 days, Disp: 20 tablet, Rfl: 0    SOCIAL HISTORY  Social History     Socioeconomic History    Marital status: /Civil Union     Spouse  name: Not on file    Number of children: Not on file    Years of education: Not on file    Highest education level: Not on file   Occupational History    Not on file   Tobacco Use    Smoking status: Never    Smokeless tobacco: Never   Substance and Sexual Activity    Alcohol use: No    Drug use: No    Sexual activity: Not on file   Other Topics Concern    Not on file   Social History Narrative    Not on file     Social Drivers of Health     Financial Resource Strain: Medium Risk (12/29/2022)    Received from [facility]    Overall Financial Resource Strain (CARDIA)     Difficulty of Paying Living Expenses: Somewhat hard   Food Insecurity: Food Insecurity Present (12/29/2022)    Received from [facility]    Hunger Vital Sign     Worried About Running Out of Food in the Last Year: Sometimes true     Ran Out of Food in the Last Year: Sometimes true   Transportation Needs: No Transportation Needs (12/29/2022)    Received from [facility]    PRAPARE - Transportation     Lack of Transportation (Medical): No     Lack of Transportation (Non-Medical): No   Physical Activity: Not on file   Stress: Stress Concern Present (5/27/2022)    Received from [facility]    [place] [employer] - Occupational Stress Questionnaire     Feeling of Stress : Rather much   Social Connections: Socially Integrated (5/27/2022)    Received from [facility]    Social Connection and Isolation Panel [NHANES]     Frequency of Communication with Friends and Family: More than three times a week     Frequency of Social Gatherings with Friends and Family: More than three times a week     Attends Denominational Services: 1 to 4 times per year     Active Member of Clubs or Organizations: Yes     Attends Club or Organization Meetings: 1 to 4 times per year     Marital Status:    Intimate Partner Violence: Not At Risk (12/29/2022)    Received from  "[facility]    Humiliation, Afraid, Rape, and Kick questionnaire     Fear of Current or Ex-Partner: No     Emotionally Abused: No     Physically Abused: No     Sexually Abused: No   Housing Stability: Unknown (5/27/2022)    Received from [facility]    Housing Stability Vital Sign     Unable to Pay for Housing in the Last Year: No     Number of Places Lived in the Last Year: Not on file     Unstable Housing in the Last Year: No       Objective     VITAL SIGNS  /78   Pulse 87   Ht 5' 5\" (1.651 m)   Wt 112 kg (248 lb)   BMI 41.27 kg/m²      PHYSICAL EXAM  General:   Well-appearing  No acute distress  Appears stated age    Cervical Spine  No tenderness to palpation over the cervical spine in the midline or of the paraspinal muscles  Full range of motion without pain  Negative spurlings   Negative Lhermitte test    Right Shoulder  Negative tenderness to palpation over the acromioclavicular joint  Positive tenderness to palpation over the long head of the biceps tendon  Shoulder effusion none present  Shoulder abduction 110 / 160  Shoulder forward flexion 100 / 160  Shoulder external rotation 50/50  Shoulder internal rotation T7/T7  Supraspinatus/ABD 3/5   Infraspinatus/ER 3/5   Positive Belly Press/SS  Positive Neer  Positive Katz  Positive O'briens    Skin is intact with no erythema, warmth or drainage  Motor strength intact distally  Sensation to light touch is normal in the axillary, radial, ulnar, and median nerve distributions.  Fingers warm and perfused    RADIOGRAPHIC EXAMINATION/DIAGNOSTICS:  Procedure: XR chest 1 view portable  Result Date: 11/21/2024  Narrative: XR CHEST PORTABLE INDICATION: Chest pain radiating down the arm. Worse while lying down. COMPARISON: Chest radiograph 5/16/2012, CT abdomen and pelvis 9/25/2019 FINDINGS: Mild pulmonary venous congestion. No pneumothorax or pleural effusion. Enlarged cardiac silhouette. Bones are unremarkable for age. " Normal upper abdomen.     Impression: Mild pulmonary venous congestion and borderline cardiomegaly. Resident: Linda Huff I, the attending radiologist, have reviewed the images and agree with the final report above. Workstation performed: [id number]     Independent interpretation of the right shoulder radiographs demonstrate mild to moderate degenerative joint disease with some osteolysis of the greater tuberosity with type II acromial spurring.    ASSESSMENT/PLAN:  Right shoulder pain, dysfunction, r/o rotator cuff tear  Bilateral hip pain, sciatica  3.   Morbid obesity, DMII    Plan obtain MRI of the right shoulder to rule out rotator cuff.  Patient will [place] back after obtaining MRI to discuss results and treatment plan.

## 2024-11-26 NOTE — PROGRESS NOTES
CHIEF COMPLAINT/REASON FOR VISIT  No chief complaint on file.       HISTORY OF PRESENT ILLNESS  Vic Lara is a 69 y.o. male who presents for evaluation of his right Shoulder. Patient was seen in ED 11/21/24 for having pain in chest and pain down both his arms.     REVIEW OF SYSTEMS  Review of systems was performed and, outside that mentioned in the HPI, it was negative for symptomology related to the integumentary, hematologic, immunologic, allergic, neurologic, cardiovascular, respiratory, GI or  systems.    MEDICAL HISTORY  There is no problem list on file for this patient.      SURGICAL HISTORY  Past Surgical History:   Procedure Laterality Date    APPENDECTOMY      BACK SURGERY         CURRENT MEDICATIONS    Current Outpatient Medications:     cyclobenzaprine (FLEXERIL) 5 mg tablet, 1-2 PO TID PRN, Disp: 12 tablet, Rfl: 0    dicyclomine (BENTYL) 20 mg tablet, Take 1 tablet (20 mg total) by mouth 2 (two) times a day for 10 days, Disp: 20 tablet, Rfl: 0    SOCIAL HISTORY  Social History     Socioeconomic History    Marital status: /Civil Union     Spouse name: Not on file    Number of children: Not on file    Years of education: Not on file    Highest education level: Not on file   Occupational History    Not on file   Tobacco Use    Smoking status: Never    Smokeless tobacco: Never   Substance and Sexual Activity    Alcohol use: No    Drug use: No    Sexual activity: Not on file   Other Topics Concern    Not on file   Social History Narrative    Not on file     Social Drivers of Health     Financial Resource Strain: Medium Risk (12/29/2022)    Received from [facility]    Overall Financial Resource Strain (CARDIA)     Difficulty of Paying Living Expenses: Somewhat hard   Food Insecurity: Food Insecurity Present (12/29/2022)    Received from [facility]    Hunger Vital Sign     Worried About Running Out of Food in the Last Year: Sometimes true     Ran Out of Food in  the Last Year: Sometimes true   Transportation Needs: No Transportation Needs (12/29/2022)    Received from [facility]    PRAPARE - Transportation     Lack of Transportation (Medical): No     Lack of Transportation (Non-Medical): No   Physical Activity: Not on file   Stress: Stress Concern Present (5/27/2022)    Received from [facility]    [place] [employer] - Occupational Stress Questionnaire     Feeling of Stress : Rather much   Social Connections: Socially Integrated (5/27/2022)    Received from [facility]    Social Connection and Isolation Panel [NHANES]     Frequency of Communication with Friends and Family: More than three times a week     Frequency of Social Gatherings with Friends and Family: More than three times a week     Attends Spiritism Services: 1 to 4 times per year     Active Member of Clubs or Organizations: Yes     Attends Club or Organization Meetings: 1 to 4 times per year     Marital Status:    Intimate Partner Violence: Not At Risk (12/29/2022)    Received from [facility]    Humiliation, Afraid, Rape, and Kick questionnaire     Fear of Current or Ex-Partner: No     Emotionally Abused: No     Physically Abused: No     Sexually Abused: No   Housing Stability: Unknown (5/27/2022)    Received from [facility]    Housing Stability Vital Sign     Unable to Pay for Housing in the Last Year: No     Number of Places Lived in the Last Year: Not on file     Unstable Housing in the Last Year: No       Objective     VITAL SIGNS  There were no vitals taken for this visit.       PHYSICAL EXAMINATION    {MJKBODYPARTEXAM:44568}      RADIOGRAPHIC EXAMINATION/DIAGNOSTICS:  Procedure: XR chest 1 view portable  Result Date: 11/21/2024  Narrative: XR CHEST PORTABLE INDICATION: Chest pain radiating down the arm. Worse while lying down. COMPARISON: Chest radiograph 5/16/2012, CT abdomen and pelvis  9/25/2019 FINDINGS: Mild pulmonary venous congestion. No pneumothorax or pleural effusion. Enlarged cardiac silhouette. Bones are unremarkable for age. Normal upper abdomen.     Impression: Mild pulmonary venous congestion and borderline cardiomegaly. Resident: Linda Huff I, the attending radiologist, have reviewed the images and agree with the final report above. Workstation performed: [id number]       ASSESSMENT/PLAN:  {LEFT/RIGHT:61233} {MJKBODYPART:96417}    ***      Scribe Attestation      I,:   am acting as a scribe while in the presence of the attending physician.:       I,:   personally performed the services described in this documentation    as scribed in my presence.:

## 2024-12-24 ENCOUNTER — HOSPITAL ENCOUNTER (OUTPATIENT)
Dept: RADIOLOGY | Facility: HOSPITAL | Age: 69
Discharge: HOME | End: 2024-12-24
Attending: ORTHOPAEDIC SURGERY

## 2025-01-02 ENCOUNTER — TELEPHONE (OUTPATIENT)
Dept: OBGYN CLINIC | Facility: CLINIC | Age: 70
End: 2025-01-02

## 2025-01-02 ENCOUNTER — TELEPHONE (OUTPATIENT)
Age: 70
End: 2025-01-02

## 2025-01-29 ENCOUNTER — OFFICE VISIT (OUTPATIENT)
Dept: FAMILY MEDICINE CLINIC | Facility: CLINIC | Age: 70
End: 2025-01-29

## 2025-01-29 VITALS
RESPIRATION RATE: 20 BRPM | OXYGEN SATURATION: 99 % | SYSTOLIC BLOOD PRESSURE: 128 MMHG | HEIGHT: 65 IN | BODY MASS INDEX: 42.65 KG/M2 | TEMPERATURE: 98.7 F | DIASTOLIC BLOOD PRESSURE: 73 MMHG | HEART RATE: 77 BPM | WEIGHT: 256 LBS

## 2025-01-29 DIAGNOSIS — I48.0 PAROXYSMAL ATRIAL FIBRILLATION (HCC): ICD-10-CM

## 2025-01-29 DIAGNOSIS — Z13.6 SCREENING FOR CARDIOVASCULAR CONDITION: ICD-10-CM

## 2025-01-29 DIAGNOSIS — I42.0 DILATED CARDIOMYOPATHY (HCC): ICD-10-CM

## 2025-01-29 DIAGNOSIS — E66.01 MORBID OBESITY WITH BMI OF 40.0-44.9, ADULT (HCC): Primary | ICD-10-CM

## 2025-01-29 DIAGNOSIS — R73.03 PREDIABETES: ICD-10-CM

## 2025-01-29 PROCEDURE — 99204 OFFICE O/P NEW MOD 45 MIN: CPT | Performed by: FAMILY MEDICINE

## 2025-01-29 NOTE — ASSESSMENT & PLAN NOTE
Patient takes Ozempic 2 mg  Takes pravastatin 10 and 40 mg  8/2023 -LDL/HDL -  WNL    Plan:  -Will do blood work and and then manage medications according to it  -Follow-up in 2 weeks for annual physical with complete blood work  -Recommended to be compliant with the medications.  -Apply lotion for dryness of hands    Orders:    Lipid panel; Future

## 2025-01-29 NOTE — PROGRESS NOTES
Name: Vic Lara      : 1955      MRN: [id number]  Encounter Provider: Maria Ines Rubi  Encounter Date: 2025   Encounter department: [facility] [place]  :  Assessment & Plan  Morbid obesity with BMI of 40.0-44.9, adult (HCC)  Patient takes Ozempic 2 mg  Takes pravastatin 10 and 40 mg  2023 -LDL/HDL -  WNL    Plan:  -Will do blood work and and then manage medications according to it  -Follow-up in 2 weeks for annual physical with complete blood work  -Recommended to be compliant with the medications.  -Apply lotion for dryness of hands    Orders:    Lipid panel; Future    Paroxysmal atrial fibrillation (HCC)  S/p ablation    patient takes Xarelto 20 mg       Dilated cardiomyopathy (HCC)  Patient takes Entresto 24-26 mg, furosemide 40 mg       Screening for cardiovascular condition    Orders:    Comprehensive metabolic panel; Future    Lipid panel; Future    Prediabetes  2023 -A1c 6    Orders:    Comprehensive metabolic panel; Future        BMI Counseling: Body mass index is 42.6 kg/m². The BMI is above normal. Nutrition recommendations include encouraging healthy choices of fruits and vegetables. Exercise recommendations include moderate physical activity 150 minutes/week. Pharmacotherapy was ordered to help aid in weight loss. Patient referred to PCP. Rationale for BMI follow-up plan is due to patient being overweight or obese.     Depression Screening and Follow-up Plan: Patient was screened for depression during today's encounter. They screened negative with a PHQ-2 score of 1.      History of Present Illness   Vic 69-year-old male with history of paroxysmal atrial fibrillation s/p ablation , dilated cardiomyopathy, obesity, prediabetes was seen in our clinic to establish care.  Patient complained of sandlike sensation in both hands on touch; denies tingling numbness or pain. Likely dryness of hands  patient had concern regarding medications.   "Patient takes multiple medication and was not sure which all to continue to take.   Patient endorses he is not compliant to medications.  No other complaints or concerns at this time.      Review of Systems   Constitutional:  Negative for chills and fever.   HENT:  Negative for ear pain and sore throat.    Eyes:  Negative for pain and visual disturbance.   Respiratory:  Negative for cough, chest tightness and shortness of breath.    Cardiovascular:  Positive for palpitations. Negative for chest pain.   Gastrointestinal:  Negative for abdominal pain, diarrhea, nausea and vomiting.   Endocrine: Negative for polydipsia and polyuria.   Genitourinary:  Negative for dysuria and hematuria.   Musculoskeletal:  Negative for arthralgias and back pain.        Sand-like sensation in hands on rubbing   Skin:  Negative for color change and rash.   Neurological:  Negative for seizures and syncope.   Psychiatric/Behavioral:  Negative for confusion. The patient is not nervous/anxious.        Objective   /73   Pulse 77   Temp 98.7 °F (37.1 °C) (Temporal)   Resp 20   Ht 5' 5\" (1.651 m)   Wt 116 kg (256 lb)   SpO2 99%   BMI 42.60 kg/m²      Physical Exam  Vitals and nursing note reviewed.   Constitutional:       General: He is not in acute distress.     Appearance: Normal appearance. He is well-developed.   HENT:      Head: Normocephalic and atraumatic.      Right Ear: External ear normal.      Left Ear: External ear normal.      Nose: Nose normal.      Mouth/Throat:      Mouth: Mucous membranes are moist.      Pharynx: Oropharynx is clear.   Eyes:      Extraocular Movements: Extraocular movements intact.      Conjunctiva/sclera: Conjunctivae normal.   Cardiovascular:      Rate and Rhythm: Normal rate and regular rhythm.      Pulses: Normal pulses.      Heart sounds: Normal heart sounds. No murmur heard.  Pulmonary:      Effort: Pulmonary effort is normal. No respiratory distress.      Breath sounds: Normal breath sounds. "   Abdominal:      General: Bowel sounds are normal.      Palpations: Abdomen is soft.      Tenderness: There is no abdominal tenderness.   Musculoskeletal:         General: No swelling. Normal range of motion.      Cervical back: Normal range of motion. No rigidity.   Skin:     General: Skin is warm and dry.   Neurological:      General: No focal deficit present.      Mental Status: He is alert and oriented to person, place, and time.   Psychiatric:         Mood and Affect: Mood normal.

## 2025-02-07 ENCOUNTER — TELEPHONE (OUTPATIENT)
Dept: FAMILY MEDICINE CLINIC | Facility: CLINIC | Age: 70
End: 2025-02-07

## 2025-02-07 NOTE — TELEPHONE ENCOUNTER
Patient and wife MRN: [id number] both scheduled for MAW on 3/5/25 with PCP    Patient can be reached at [phone]

## 2025-02-07 NOTE — TELEPHONE ENCOUNTER
----- Message from Maria Ines Rubi sent at 2/6/2025  2:39 PM EST -----  Regarding: Schedule Annual physical  Good afternoon! Kindly schedule an annual physical appointments for this pt and also for wife Syeda Sow for 2/12/2024. Thank you!

## 2025-02-19 ENCOUNTER — RA CDI HCC (OUTPATIENT)
Dept: OTHER | Facility: HOSPITAL | Age: 70
End: 2025-02-19

## 2025-02-20 NOTE — PROGRESS NOTES
HCC coding opportunities          Chart Reviewed number of suggestions sent to Provider: 2   I42.0, E66.01    Patients Insurance     Medicare Insurance: [employer] Advantage

## 2025-02-28 PROBLEM — Z13.6 SCREENING FOR CARDIOVASCULAR CONDITION: Status: RESOLVED | Noted: 2025-01-29 | Resolved: 2025-02-28

## 2025-03-05 ENCOUNTER — TELEPHONE (OUTPATIENT)
Dept: FAMILY MEDICINE CLINIC | Facility: CLINIC | Age: 70
End: 2025-03-05

## 2025-03-05 NOTE — TELEPHONE ENCOUNTER
No VM No message left      No Show appointment on 3/5/25 at 2 pm with  PCP     Patient can be reached at [phone]

## 2025-04-21 ENCOUNTER — TELEPHONE (OUTPATIENT)
Dept: NEUROLOGY | Facility: CLINIC | Age: 70
End: 2025-04-21

## 2025-04-22 ENCOUNTER — OFFICE VISIT (OUTPATIENT)
Dept: NEUROLOGY | Facility: CLINIC | Age: 70
End: 2025-04-22
Payer: MEDICARE

## 2025-04-22 VITALS
OXYGEN SATURATION: 97 % | SYSTOLIC BLOOD PRESSURE: 140 MMHG | WEIGHT: 251 LBS | DIASTOLIC BLOOD PRESSURE: 78 MMHG | HEART RATE: 93 BPM | BODY MASS INDEX: 41.77 KG/M2

## 2025-04-22 DIAGNOSIS — R41.3 AMNESIA/MEMORY DISORDER: ICD-10-CM

## 2025-04-22 DIAGNOSIS — G47.33 OBSTRUCTIVE SLEEP APNEA (ADULT) (PEDIATRIC): ICD-10-CM

## 2025-04-22 DIAGNOSIS — R20.2 PARESTHESIA: ICD-10-CM

## 2025-04-22 DIAGNOSIS — Z01.89 ENCOUNTER FOR IMAGING TO SCREEN FOR METAL PRIOR TO MRI: ICD-10-CM

## 2025-04-22 DIAGNOSIS — E53.1 PYRIDOXINE DEFICIENCY: ICD-10-CM

## 2025-04-22 DIAGNOSIS — R25.1 TREMOR: Primary | ICD-10-CM

## 2025-04-22 PROCEDURE — 99203 OFFICE O/P NEW LOW 30 MIN: CPT | Performed by: STUDENT IN AN ORGANIZED HEALTH CARE EDUCATION/TRAINING PROGRAM

## 2025-04-22 PROCEDURE — G2211 COMPLEX E/M VISIT ADD ON: HCPCS | Performed by: STUDENT IN AN ORGANIZED HEALTH CARE EDUCATION/TRAINING PROGRAM

## 2025-04-22 RX ORDER — DULOXETIN HYDROCHLORIDE 30 MG/1
60 CAPSULE, DELAYED RELEASE ORAL DAILY
Qty: 180 CAPSULE | Refills: 2 | Status: SHIPPED | OUTPATIENT
Start: 2025-04-22

## 2025-04-22 RX ORDER — KETOROLAC TROMETHAMINE 30 MG/ML
INJECTION, SOLUTION INTRAMUSCULAR; INTRAVENOUS
COMMUNITY
Start: 2025-03-19

## 2025-04-22 RX ORDER — HYDROCHLOROTHIAZIDE 12.5 MG/1
CAPSULE ORAL
COMMUNITY
Start: 2025-04-16

## 2025-04-22 NOTE — PATIENT INSTRUCTIONS
- Lab work to obtain: B1, B12, B6, folate, TSH, Lyme,  ceruloplasmin, vitamin D, PTH  - Will get MRI brain neuroquant without contrast to rule out any structural abnormalities causing noted tremors  - Has history of noted AHI of 26 on past polysomnography in 2022 will give referral for sleep evaluation for CPAP  - Lab work to obtain: B1, B12, B6, folate, TSH, Lyme,  ceruloplasmin, vitamin D, PTH  - Can increase Cymbalta to 60 mg to see if it will help with noted painful paresthesias  - f/u with PCP for DM management
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
## 2025-06-02 ENCOUNTER — TELEPHONE (OUTPATIENT)
Age: 70
End: 2025-06-02

## 2025-06-02 NOTE — TELEPHONE ENCOUNTER
Patient called in requesting a follow up with Dr. Gibson.     I have informed of new provider and he agreed to come in to first available In July with Dr. Huerta.

## 2025-07-14 ENCOUNTER — TELEPHONE (OUTPATIENT)
Dept: NEUROLOGY | Facility: CLINIC | Age: 70
End: 2025-07-14